# Patient Record
Sex: MALE | Race: OTHER
[De-identification: names, ages, dates, MRNs, and addresses within clinical notes are randomized per-mention and may not be internally consistent; named-entity substitution may affect disease eponyms.]

---

## 2021-07-27 ENCOUNTER — HOSPITAL ENCOUNTER (EMERGENCY)
Dept: HOSPITAL 7 - FB.ED | Age: 21
Discharge: HOME | End: 2021-07-27
Payer: SELF-PAY

## 2021-07-27 DIAGNOSIS — R79.89: ICD-10-CM

## 2021-07-27 DIAGNOSIS — R74.8: ICD-10-CM

## 2021-07-27 DIAGNOSIS — E86.0: Primary | ICD-10-CM

## 2021-07-27 DIAGNOSIS — Z72.0: ICD-10-CM

## 2021-07-27 PROCEDURE — 83880 ASSAY OF NATRIURETIC PEPTIDE: CPT

## 2021-07-27 PROCEDURE — 93005 ELECTROCARDIOGRAM TRACING: CPT

## 2021-07-27 PROCEDURE — 85025 COMPLETE CBC W/AUTO DIFF WBC: CPT

## 2021-07-27 PROCEDURE — 96375 TX/PRO/DX INJ NEW DRUG ADDON: CPT

## 2021-07-27 PROCEDURE — 83690 ASSAY OF LIPASE: CPT

## 2021-07-27 PROCEDURE — 84484 ASSAY OF TROPONIN QUANT: CPT

## 2021-07-27 PROCEDURE — 36415 COLL VENOUS BLD VENIPUNCTURE: CPT

## 2021-07-27 PROCEDURE — 96374 THER/PROPH/DIAG INJ IV PUSH: CPT

## 2021-07-27 PROCEDURE — 82550 ASSAY OF CK (CPK): CPT

## 2021-07-27 PROCEDURE — 81001 URINALYSIS AUTO W/SCOPE: CPT

## 2021-07-27 PROCEDURE — 80053 COMPREHEN METABOLIC PANEL: CPT

## 2021-07-27 PROCEDURE — 99284 EMERGENCY DEPT VISIT MOD MDM: CPT

## 2021-07-27 PROCEDURE — 86140 C-REACTIVE PROTEIN: CPT

## 2021-07-27 NOTE — EDM.PDOC
ED HPI GENERAL MEDICAL PROBLEM





- General


Chief Complaint: General


Stated Complaint: DEHYDRATED


Time Seen by Provider: 07/27/21 13:20


Source of Information: Reports: Patient


History Limitations: Reports: No Limitations





- History of Present Illness


INITIAL COMMENTS - FREE TEXT/NARRATIVE: 





c/o n/v





thinks he is dehydrated





hitchhiked from [place] to [place] 2w ago, been working construction





states to me for past 1.5w he has been lightheaded, trouble breathing, face 

tingling, feeling faint, dizzy





no HA/abd pain/CP, has had "funny feeling" at his L breast





no f/c/d





no have emesis today prior to arrival





slept well last night, went to work at 6:45a, did not feel well, kept working, 

comes to ED after V





ate chicken sandwich and toast today





drinking Gatorade x 2 (has one with him), pop, water, OJ





no COVID vax, had COVID Nov 2020





drank 2 light beers yesterday, none today


  ** Left Chest


Pain Score (Numeric/FACES): 3





- Related Data


                                    Allergies











Allergy/AdvReac Type Severity Reaction Status Date / Time


 


No Known Allergies Allergy   Verified 07/27/21 13:27











Home Meds: 


                                    Home Meds





NK [No Known Home Meds]  07/27/21 [History]











Past Medical History





- Past Health History


Medical/Surgical History: Denies Medical/Surgical History


Gastrointestinal History: Reports: Hepatitis


Other Gastrointestinal History: Hep B


Musculoskeletal History: Reports: Other (See Below)


Other Musculoskeletal History: Fracture both collarbones and left hip


Psychiatric History: Reports: Anxiety





- Infectious Disease History


Infectious Disease History: Reports: Hepatitis B





Social & Family History





- Family History


Family Medical History: No Pertinent Family History





- Tobacco Use


Tobacco Use Status *Q: Current Every Day Tobacco User


Years of Tobacco use: 6


Packs/Tins Daily: 1





- Caffeine Use


Caffeine Use: Reports: Energy Drinks





- Recreational Drug Use


Recreational Drug Use: No





ED ROS GENERAL





- Review of Systems


Review Of Systems: See Below


Constitutional: Reports: No Symptoms


HEENT: Reports: No Symptoms


Respiratory: Reports: No Symptoms.  Denies: Shortness of Breath, Cough


Cardiovascular: Reports: No Symptoms


Endocrine: Reports: No Symptoms


GI/Abdominal: Reports: Nausea, Vomiting.  Denies: Abdominal Pain, Diarrhea


: Reports: No Symptoms


Musculoskeletal: Reports: No Symptoms


Skin: Reports: No Symptoms


Neurological: Reports: No Symptoms


Psychiatric: Reports: No Symptoms


Hematologic/Lymphatic: Reports: No Symptoms


Immunologic: Reports: No Symptoms





ED EXAM, GENERAL





- Physical Exam


Exam: See Below


Exam Limited By: No Limitations


General Appearance: Alert, WD/WN, Other (alert, nontoxic, sit on edge of bed, 

holding emesis bag with 150 ml emesis)


Eye Exam: Bilateral Eye: EOMI, PERRL


Nose: Normal Inspection, Normal Mucosa, No Blood


Throat/Mouth: Normal Inspection, Normal Lips, Normal Teeth, Normal Gums, Normal 

Oropharynx, Normal Voice, No Airway Compromise


Head: Atraumatic, Normocephalic


Neck: Normal Inspection, Supple, Non-Tender, Full Range of Motion.  No: 

Lymphadenopathy (R), Lymphadenopathy (L)


Respiratory/Chest: No Respiratory Distress, Lungs Clear, Normal Breath Sounds, 

No Accessory Muscle Use, Chest Non-Tender


Cardiovascular: Regular Rate, Rhythm, No Edema, No Gallop, No Murmur


GI/Abdominal: Normal Bowel Sounds, Soft, Non-Tender, No Organomegaly, No 

Distention


Back Exam: Normal Inspection, Full Range of Motion.  No: CVA Tenderness (R), CVA

 Tenderness (L)


Extremities: Normal Inspection, Non-Tender, No Pedal Edema


Neurological: Alert, Oriented, CN II-XII Intact, Normal Cognition, No 

Motor/Sensory Deficits


Psychiatric: Normal Affect, Normal Mood


Skin Exam: Warm, Dry, Intact, Normal Color, No Rash


Lymphatic: No Adenopathy


  ** #1 Interpretation


EKG Date: 07/27/21


Time: 14:16


Rhythm: NSR


Rate (Beats/Min): 81


Axis: Normal


P-Wave: Present


QRS: Normal


ST-T: Normal


QT: Normal


Comparison: NA - No Prior EKG


EKG Interpretation Comments: 





wnl for age





Course





- Vital Signs


Last Recorded V/S: 


                                Last Vital Signs











Temp  37.3 C   07/27/21 13:11


 


Pulse  99   07/27/21 13:11


 


Resp  16   07/27/21 13:11


 


BP  142/88 H  07/27/21 13:11


 


Pulse Ox  96   07/27/21 13:11














- Orders/Labs/Meds


Orders: 


                               Active Orders 24 hr











 Category Date Time Status


 


 EKG Documentation Completion [RC] STAT Care  07/27/21 14:17 Active


 


 EKG 12 Lead [EK] Routine Ther  07/27/21 14:17 Ordered











Labs: 


                                Laboratory Tests











  07/27/21 07/27/21 07/27/21 Range/Units





  13:55 13:55 13:55 


 


WBC  5.2    (3.2-10.1)  x10-3/uL


 


RBC  5.15    (3.90-5.90)  x10(6)uL


 


Hgb  14.8    (12.9-17.7)  g/dL


 


Hct  44.0    (38.3-50.1)  %


 


MCV  85.4    (80.8-98.7)  fL


 


MCH  28.8    (27.0-33.3)  pg


 


MCHC  33.7    (28.7-35.3)  g/dL


 


RDW  15.2 H    (12.4-15.0)  %


 


Plt Count  285    (117-477)  x10(3)uL


 


MPV  7.6    (6.7-11.0)  fL


 


Neut % (Auto)  67.4    (40.3-71.8)  %


 


Lymph % (Auto)  21.2    (15.8-45.3)  %


 


Mono % (Auto)  9.8    (5.5-15.2)  %


 


Eos % (Auto)  1.3    (0.1-6.8)  %


 


Baso % (Auto)  0.3    (0.3-3.8)  %


 


Neut # (Auto)  3.5    (1.7-6.9)  x10-3/uL


 


Lymph # (Auto)  1.1    (0.5-4.5)  x10-3/uL


 


Mono # (Auto)  0.5    (0.0-1.2)  x10-3/uL


 


Eos # (Auto)  0.1    (0.0-0.6)  x10-3/uL


 


Baso # (Auto)  0.0    (0.0-0.3)  x10-3/uL


 


Sodium   145   (135-145)  mmol/L


 


Potassium   3.7   (3.5-5.3)  mmol/L


 


Chloride   104   (100-110)  mmol/L


 


Carbon Dioxide   28   (21-32)  mmol/L


 


BUN   19 H   (7-18)  mg/dL


 


Creatinine   1.0   (0.70-1.30)  mg/dL


 


Est Cr Clr Drug Dosing   119.64   mL/min


 


Estimated GFR (MDRD)   > 60   (>60)  


 


BUN/Creatinine Ratio   19.0   (9-20)  


 


Glucose   87   ()  mg/dL


 


Calcium   9.0   (8.6-10.2)  mg/dL


 


Total Bilirubin   0.7   (0.1-1.3)  mg/dL


 


AST   18   (5-25)  IU/L


 


ALT   26   (12-36)  U/L


 


Alkaline Phosphatase   96   ()  IU/L


 


Creatine Kinase   332 H*   ()  IU/L


 


Troponin I    < 4.0 L  (4.0-60.3)  pg/mL


 


C-Reactive Protein    < 0.2 L  (0.5-0.9)  mg/dL


 


NT-Pro-B Natriuret Pep     (<=125)  pg/mL


 


Total Protein   7.8   (6.0-8.0)  g/dL


 


Albumin   4.3   (3.5-5.2)  g/dL


 


Globulin   3.5   g/dL


 


Albumin/Globulin Ratio   1.2   


 


Lipase    72 L  ()  U/L


 


Urine Color     (YELLOW)  


 


Urine Appearance     (CLEAR)  


 


Urine pH     (5.0-6.5)  


 


Ur Specific Gravity     (1.010-1.025)  


 


Urine Protein     (NEGATIVE)  mg/dL


 


Urine Glucose (UA)     (NORMAL)  mg/dL


 


Urine Ketones     (NEGATIVE)  mg/dL


 


Urine Occult Blood     (NEGATIVE)  


 


Urine Nitrite     (NEGATIVE)  


 


Urine Bilirubin     (NEGATIVE)  


 


Urine Urobilinogen     (NEGATIVE)  mg/dL


 


Ur Leukocyte Esterase     (NEGATIVE)  


 


Urine RBC     (0-5)  


 


Urine WBC     (0-5)  


 


Ur Squamous Epith Cells     (NS,R,O)  


 


Urine Bacteria     (NS)  














  07/27/21 07/27/21 07/27/21 Range/Units





  14:55 16:00 16:00 


 


WBC     (3.2-10.1)  x10-3/uL


 


RBC     (3.90-5.90)  x10(6)uL


 


Hgb     (12.9-17.7)  g/dL


 


Hct     (38.3-50.1)  %


 


MCV     (80.8-98.7)  fL


 


MCH     (27.0-33.3)  pg


 


MCHC     (28.7-35.3)  g/dL


 


RDW     (12.4-15.0)  %


 


Plt Count     (117-477)  x10(3)uL


 


MPV     (6.7-11.0)  fL


 


Neut % (Auto)     (40.3-71.8)  %


 


Lymph % (Auto)     (15.8-45.3)  %


 


Mono % (Auto)     (5.5-15.2)  %


 


Eos % (Auto)     (0.1-6.8)  %


 


Baso % (Auto)     (0.3-3.8)  %


 


Neut # (Auto)     (1.7-6.9)  x10-3/uL


 


Lymph # (Auto)     (0.5-4.5)  x10-3/uL


 


Mono # (Auto)     (0.0-1.2)  x10-3/uL


 


Eos # (Auto)     (0.0-0.6)  x10-3/uL


 


Baso # (Auto)     (0.0-0.3)  x10-3/uL


 


Sodium     (135-145)  mmol/L


 


Potassium     (3.5-5.3)  mmol/L


 


Chloride     (100-110)  mmol/L


 


Carbon Dioxide     (21-32)  mmol/L


 


BUN     (7-18)  mg/dL


 


Creatinine     (0.70-1.30)  mg/dL


 


Est Cr Clr Drug Dosing     mL/min


 


Estimated GFR (MDRD)     (>60)  


 


BUN/Creatinine Ratio     (9-20)  


 


Glucose     ()  mg/dL


 


Calcium     (8.6-10.2)  mg/dL


 


Total Bilirubin     (0.1-1.3)  mg/dL


 


AST     (5-25)  IU/L


 


ALT     (12-36)  U/L


 


Alkaline Phosphatase     ()  IU/L


 


Creatine Kinase     ()  IU/L


 


Troponin I   < 4.0 L   (4.0-60.3)  pg/mL


 


C-Reactive Protein     (0.5-0.9)  mg/dL


 


NT-Pro-B Natriuret Pep    9  (<=125)  pg/mL


 


Total Protein     (6.0-8.0)  g/dL


 


Albumin     (3.5-5.2)  g/dL


 


Globulin     g/dL


 


Albumin/Globulin Ratio     


 


Lipase     ()  U/L


 


Urine Color  Yellow    (YELLOW)  


 


Urine Appearance  Clear    (CLEAR)  


 


Urine pH  6.0    (5.0-6.5)  


 


Ur Specific Gravity  1.005 L    (1.010-1.025)  


 


Urine Protein  Negative    (NEGATIVE)  mg/dL


 


Urine Glucose (UA)  Normal    (NORMAL)  mg/dL


 


Urine Ketones  Negative    (NEGATIVE)  mg/dL


 


Urine Occult Blood  Negative    (NEGATIVE)  


 


Urine Nitrite  Negative    (NEGATIVE)  


 


Urine Bilirubin  Negative    (NEGATIVE)  


 


Urine Urobilinogen  Normal    (NEGATIVE)  mg/dL


 


Ur Leukocyte Esterase  Negative    (NEGATIVE)  


 


Urine RBC  Not seen    (0-5)  


 


Urine WBC  0-5    (0-5)  


 


Ur Squamous Epith Cells  Few H    (NS,R,O)  


 


Urine Bacteria  Rare H    (NS)  











Meds: 


Medications














Discontinued Medications














Generic Name Dose Route Start Last Admin





  Trade Name Freq  PRN Reason Stop Dose Admin


 


Sodium Chloride  1,000 mls @ 999 mls/hr  07/27/21 13:39  07/27/21 13:43





  Normal Saline  IV  07/27/21 14:39  999 mls/hr





  .BOLUS ONE   Administration


 


Sodium Chloride  1,000 mls @ 999 mls/hr  07/27/21 13:40  07/27/21 14:48





  Normal Saline  IV  07/27/21 14:40  999 mls/hr





  .BOLUS ONE   Administration


 


Sodium Chloride  1,000 mls @ 999 mls/hr  07/27/21 14:44  07/27/21 15:53





  Normal Saline  IV  07/27/21 15:44  999 mls/hr





  .BOLUS ONE   Administration


 


Ketorolac Tromethamine  30 mg  07/27/21 13:39  07/27/21 13:43





  Ketorolac 30 Mg/Ml Sdv  IVPUSH  07/27/21 13:40  30 mg





  ONETIME ONE   Administration


 


Ondansetron HCl  4 mg  07/27/21 13:39  07/27/21 13:43





  Ondansetron 4 Mg/2 Ml Sdv  IVPUSH  07/27/21 13:40  4 mg





  ONETIME ONE   Administration














- Re-Assessments/Exams


Free Text/Narrative Re-Assessment/Exam: 





07/27/21 17:07


pt with inc'd BUN and CK c/w mild to mod dehydration





u/a after 1 liter NS was unremarkable





given 3 liters of NS total





pt reports h/o anxiety, does appear mildly anxious here





stated he did not feel better after IVFs which is inconsistent with physiologic 

parameters and seems c/w anxiety





he had vague, nonspecific complaints about his heart with palpitations at times 

going back to when he had COVID in November, however trop x 2 (2h apart) and BNP

 were neg without evidence of CMP or CVD, EKG was WNL for age, f/u with a local 

PCP was recommended as pt stated he was going to live and work in the area 

indefinitely





states he is living with his boss, that there are just the 2 of them in the 

household





Departure





- Departure


Time of Disposition: 17:05


Disposition: Home, Self-Care 01


Condition: Good


Clinical Impression: 


 Dehydration, Elevated BUN, Elevated creatine kinase








- Discharge Information


*PRESCRIPTION DRUG MONITORING PROGRAM REVIEWED*: Not Applicable


*COPY OF PRESCRIPTION DRUG MONITORING REPORT IN PATIENT RAMA: Not Applicable


Instructions:  Rehydration, Adult


Referrals: 


PCP,None [Primary Care Provider] - 


Forms:  ED Department Discharge


Additional Instructions: 


Maintain fluids, at least 4 liters daily without caffeine when working in hot 

weather.





Rest this evening.





May return to work tomorrow.





See a primary care physician in the next week for follow up and further 

recommendations.





Sepsis Event Note (ED)





- Evaluation


Sepsis Screening Result: No Definite Risk





- Focused Exam


Vital Signs: 


                                   Vital Signs











  Temp Pulse Resp BP Pulse Ox


 


 07/27/21 13:11  37.3 C  99  16  142/88 H  96














- My Orders


Last 24 Hours: 


My Active Orders





07/27/21 14:17


EKG Documentation Completion [RC] STAT 


EKG 12 Lead [EK] Routine 














- Assessment/Plan


Last 24 Hours: 


My Active Orders





07/27/21 14:17


EKG Documentation Completion [RC] STAT 


EKG 12 Lead [EK] Routine

## 2021-08-02 ENCOUNTER — HOSPITAL ENCOUNTER (EMERGENCY)
Dept: HOSPITAL 7 - FB.ED | Age: 21
Discharge: HOME | End: 2021-08-02
Payer: SELF-PAY

## 2021-08-02 DIAGNOSIS — Z72.0: ICD-10-CM

## 2021-08-02 DIAGNOSIS — Z79.899: ICD-10-CM

## 2021-08-02 DIAGNOSIS — F41.9: Primary | ICD-10-CM

## 2021-08-02 PROCEDURE — 96372 THER/PROPH/DIAG INJ SC/IM: CPT

## 2021-08-02 PROCEDURE — 99283 EMERGENCY DEPT VISIT LOW MDM: CPT

## 2021-08-02 NOTE — EDM.PDOC
ED HPI GENERAL MEDICAL PROBLEM





- General


Chief Complaint: General


Stated Complaint: DIZZY/SOB


Time Seen by Provider: 08/02/21 12:10


Source of Information: Reports: Patient


History Limitations: Reports: No Limitations





- History of Present Illness


INITIAL COMMENTS - FREE TEXT/NARRATIVE: 





Patient presented to the  ED because of dyspnea for 1 month. He was diagnosed 

with Anxiety 4 months ago while he was in [place] and is supposed to be taking 

Prozac 20 mg daily but has not filled his prescription. 





- Related Data


                                    Allergies











Allergy/AdvReac Type Severity Reaction Status Date / Time


 


No Known Allergies Allergy   Verified 07/27/21 13:27











Home Meds: 


                                    Home Meds





FLUoxetine HCl [Prozac] 20 mg PO DAILY #30 capsule 08/02/21 [Rx]











Past Medical History





- Past Health History


Medical/Surgical History: Denies Medical/Surgical History


Gastrointestinal History: Reports: Hepatitis


Other Gastrointestinal History: Hep B


Musculoskeletal History: Reports: Other (See Below)


Other Musculoskeletal History: Fracture both collarbones and left hip


Psychiatric History: Reports: Anxiety





- Infectious Disease History


Infectious Disease History: Reports: Hepatitis B





Social & Family History





- Family History


Family Medical History: No Pertinent Family History





- Tobacco Use


Tobacco Use Status *Q: Current Every Day Tobacco User


Years of Tobacco use: 5


Packs/Tins Daily: 0.5





- Caffeine Use


Caffeine Use: Reports: Soda





- Recreational Drug Use


Recreational Drug Use: No





ED ROS GENERAL





- Review of Systems


Review Of Systems: See Below


Constitutional: Reports: No Symptoms


HEENT: Reports: No Symptoms


Respiratory: Reports: Shortness of Breath


Cardiovascular: Reports: No Symptoms


Endocrine: Reports: No Symptoms


GI/Abdominal: Reports: No Symptoms


: Reports: No Symptoms


Musculoskeletal: Reports: No Symptoms


Skin: Reports: No Symptoms


Neurological: Reports: No Symptoms


Psychiatric: Reports: Anxiety


Hematologic/Lymphatic: Reports: No Symptoms





ED EXAM, GENERAL





- Physical Exam


Exam: See Below


Exam Limited By: No Limitations


General Appearance: Alert, No Apparent Distress


Eye Exam: Bilateral Eye: PERRL


Ears: Normal External Exam, Normal Canal


Nose: Normal Inspection, Normal Mucosa, No Blood


Throat/Mouth: Normal Inspection, Normal Lips, Normal Teeth


Head: Atraumatic, Normocephalic


Neck: Normal Inspection, Supple, Non-Tender, Full Range of Motion


Respiratory/Chest: No Respiratory Distress, Lungs Clear, Normal Breath Sounds


Cardiovascular: Normal Peripheral Pulses, Regular Rate, Rhythm, No Edema, No 

Gallop, No JVD, No Murmur, No Rub


GI/Abdominal: Normal Bowel Sounds, Soft, Non-Tender, No Organomegaly, No 

Distention, No Abnormal Bruit, No Mass


Back Exam: Normal Inspection, Full Range of Motion


Extremities: Normal Inspection, Normal Range of Motion, Non-Tender


  ** #1 Interpretation


EKG Date: 08/02/21


Time: 12:25


Rhythm: NSR


Rate (Beats/Min): 67


Axis: Normal


P-Wave: Present


QRS: Normal


ST-T: Normal


QT: Normal


Comparison: NA - No Prior EKG


EKG Interpretation Comments: 





NSR





Course





- Vital Signs


Text/Narrative:: 





Ativan 1 mg IM x1


Last Recorded V/S: 


                                Last Vital Signs











Temp  36.4 C   08/02/21 12:05


 


Pulse  75   08/02/21 12:05


 


Resp  20   08/02/21 12:05


 


BP  128/83   08/02/21 12:05


 


Pulse Ox  96   08/02/21 12:05














- Orders/Labs/Meds


Meds: 


Medications














Discontinued Medications














Generic Name Dose Route Start Last Admin





  Trade Name Baldomero  PRN Reason Stop Dose Admin


 


Lorazepam  1 mg  08/02/21 12:55  08/02/21 13:09





  Lorazepam 2 Mg/Ml Sdv  IM  08/02/21 12:56  1 mg





  NOW STA   Administration














Departure





- Departure


Time of Disposition: 13:10


Disposition: Home, Self-Care 01


Condition: Good


Clinical Impression: 


 Anxiety








- Discharge Information


Prescriptions: 


FLUoxetine HCl [Prozac] 20 mg PO DAILY #30 capsule


Instructions:  Generalized Anxiety Disorder, Adult, Managing Anxiety, Adult


Referrals: 


PCP,None [Primary Care Provider] - 


Forms:  ED Department Discharge


Additional Instructions: 


Please read discharge instructions on anxiety disorder


Take prozac 20 mg daily


Follow up with your doctor  after 2 weeks to see how you are doing with the the 

prozac


Call either [facility] or [facility] in [place] to schedule an appointment





Sepsis Event Note (ED)





- Evaluation


Sepsis Screening Result: No Definite Risk





- Focused Exam


Vital Signs: 


                                   Vital Signs











  Temp Pulse Resp BP Pulse Ox


 


 08/02/21 12:05  36.4 C  75  20  128/83  96

## 2021-08-11 ENCOUNTER — HOSPITAL ENCOUNTER (EMERGENCY)
Dept: HOSPITAL 7 - FB.ED | Age: 21
Discharge: HOME | End: 2021-08-11
Payer: SELF-PAY

## 2021-08-11 DIAGNOSIS — E86.0: Primary | ICD-10-CM

## 2021-08-11 DIAGNOSIS — R11.2: ICD-10-CM

## 2021-08-11 DIAGNOSIS — Z79.899: ICD-10-CM

## 2021-08-11 PROCEDURE — 80307 DRUG TEST PRSMV CHEM ANLYZR: CPT

## 2021-08-11 PROCEDURE — 85025 COMPLETE CBC W/AUTO DIFF WBC: CPT

## 2021-08-11 PROCEDURE — 84484 ASSAY OF TROPONIN QUANT: CPT

## 2021-08-11 PROCEDURE — 99285 EMERGENCY DEPT VISIT HI MDM: CPT

## 2021-08-11 PROCEDURE — 86140 C-REACTIVE PROTEIN: CPT

## 2021-08-11 PROCEDURE — C9113 INJ PANTOPRAZOLE SODIUM, VIA: HCPCS

## 2021-08-11 PROCEDURE — 36415 COLL VENOUS BLD VENIPUNCTURE: CPT

## 2021-08-11 PROCEDURE — 96375 TX/PRO/DX INJ NEW DRUG ADDON: CPT

## 2021-08-11 PROCEDURE — 93005 ELECTROCARDIOGRAM TRACING: CPT

## 2021-08-11 PROCEDURE — 81001 URINALYSIS AUTO W/SCOPE: CPT

## 2021-08-11 PROCEDURE — 83690 ASSAY OF LIPASE: CPT

## 2021-08-11 PROCEDURE — 80305 DRUG TEST PRSMV DIR OPT OBS: CPT

## 2021-08-11 PROCEDURE — 96376 TX/PRO/DX INJ SAME DRUG ADON: CPT

## 2021-08-11 PROCEDURE — 96374 THER/PROPH/DIAG INJ IV PUSH: CPT

## 2021-08-11 PROCEDURE — 80053 COMPREHEN METABOLIC PANEL: CPT

## 2021-08-11 PROCEDURE — 82550 ASSAY OF CK (CPK): CPT

## 2021-08-11 NOTE — EDM.PDOC
ED HPI GENERAL MEDICAL PROBLEM





- General


Stated Complaint: THROWING UP BLOOD/DIZZY


Time Seen by Provider: 08/11/21 11:45


Source of Information: Reports: Patient, Other (friend)


History Limitations: Reports: No Limitations


  ** Chest


Pain Score (Numeric/FACES): 7





- Related Data


                                    Allergies











Allergy/AdvReac Type Severity Reaction Status Date / Time


 


No Known Allergies Allergy   Verified 07/27/21 13:27











Home Meds: 


                                    Home Meds





Omeprazole 20 mg PO DAILY #14 capsule. 08/11/21 [Rx]


Ondansetron [Ondansetron ODT] 4 mg PO Q6H PRN #8 tab.sultanadis 08/11/21 [Rx]











Past Medical History





- Past Health History


Medical/Surgical History: Denies Medical/Surgical History


Gastrointestinal History: Reports: Hepatitis


Other Gastrointestinal History: Hep B


Musculoskeletal History: Reports: Other (See Below)


Other Musculoskeletal History: Fracture both collarbones and left hip


Psychiatric History: Reports: Anxiety





- Infectious Disease History


Infectious Disease History: Reports: Hepatitis B





Social & Family History





- Family History


Family Medical History: No Pertinent Family History





- Caffeine Use


Caffeine Use: Reports: Soda





ED ROS GENERAL





- Review of Systems


Review Of Systems: See Below


Constitutional: Reports: No Symptoms


HEENT: Reports: No Symptoms


Respiratory: Reports: No Symptoms


Cardiovascular: Reports: No Symptoms


Endocrine: Reports: No Symptoms


GI/Abdominal: Reports: Abdominal Pain


: Reports: No Symptoms


Musculoskeletal: Reports: No Symptoms


Skin: Reports: No Symptoms


Neurological: Reports: No Symptoms


Psychiatric: Reports: No Symptoms


Hematologic/Lymphatic: Reports: No Symptoms


Immunologic: Reports: No Symptoms





ED EXAM, GI/ABD





- Physical Exam


Exam: See Below


Exam Limited By: No Limitations


General Appearance: Alert, WD/WN, Mild Distress


Ears: Hearing Grossly Normal


Nose: Normal Inspection


Throat/Mouth: Normal Inspection, Normal Lips, Normal Voice, No Airway Compromise


Head: Atraumatic, Normocephalic


Neck: Normal Inspection, Supple, Non-Tender, Full Range of Motion.  No: 

Lymphadenopathy (R), Lymphadenopathy (L)


Respiratory/Chest: No Respiratory Distress, Lungs Clear, Normal Breath Sounds, 

No Accessory Muscle Use, Chest Non-Tender


Cardiovascular: Regular Rate, Rhythm, No Edema, No Murmur


GI/Abdominal Exam: Normal Bowel Sounds, Other (1+ tender across entire upper abd

that improved with fluids and meds)


Back Exam: Normal Inspection, Full Range of Motion


Extremities: Normal Inspection, Non-Tender, No Pedal Edema


Neurological: Alert, Oriented, CN II-XII Intact, Normal Cognition, No 

Motor/Sensory Deficits


Skin Exam: Warm, Dry, Intact, Normal Color, No Rash


Lymphatic: No Adenopathy


  ** #1 Interpretation


EKG Date: 08/11/21


Time: 00:23


Rhythm: NSR


Rate (Beats/Min): 87


Axis: Normal


P-Wave: Present


ST-T: Normal


Comparison: No Change


EKG Interpretation Comments: 





NSC c/w 6-13-19, mild inc'd QT from 440 to 491 now, inc'd  to 225, LAFB, 

baseline artifact, no acute/ischemic/ST changes





Course





- Vital Signs


Last Recorded V/S: 


                                Last Vital Signs











Temp  36.8 C   08/11/21 11:18


 


Pulse  117 H  08/11/21 11:18


 


Resp  21 H  08/11/21 11:18


 


BP  141/92 H  08/11/21 11:18


 


Pulse Ox  98   08/11/21 11:18














- Orders/Labs/Meds


Orders: 


                               Active Orders 24 hr











 Category Date Time Status


 


 EKG Documentation Completion [RC] ASDIRECTED Care  08/11/21 16:10 Ordered


 


 EKG 12 Lead [EK] Routine Ther  08/11/21 16:10 Ordered











Labs: 


                                Laboratory Tests











  08/11/21 08/11/21 08/11/21 Range/Units





  11:55 11:55 11:55 


 


WBC  10.2 H    (3.2-10.1)  x10-3/uL


 


RBC  5.73    (3.90-5.90)  x10(6)uL


 


Hgb  16.1    (12.9-17.7)  g/dL


 


Hct  48.5    (38.3-50.1)  %


 


MCV  84.7    (80.8-98.7)  fL


 


MCH  28.1    (27.0-33.3)  pg


 


MCHC  33.2    (28.7-35.3)  g/dL


 


RDW  14.9    (12.4-15.0)  %


 


Plt Count  321    (117-477)  x10(3)uL


 


MPV  7.5    (6.7-11.0)  fL


 


Neut % (Auto)  82.4 H    (40.3-71.8)  %


 


Lymph % (Auto)  10.5 L    (15.8-45.3)  %


 


Mono % (Auto)  6.8    (5.5-15.2)  %


 


Eos % (Auto)  0.1    (0.1-6.8)  %


 


Baso % (Auto)  0.2 L    (0.3-3.8)  %


 


Neut # (Auto)  8.4 H    (1.7-6.9)  x10-3/uL


 


Lymph # (Auto)  1.1    (0.5-4.5)  x10-3/uL


 


Mono # (Auto)  0.7    (0.0-1.2)  x10-3/uL


 


Eos # (Auto)  0.0    (0.0-0.6)  x10-3/uL


 


Baso # (Auto)  0.0    (0.0-0.3)  x10-3/uL


 


Sodium   143   (135-145)  mmol/L


 


Potassium   4.2   (3.5-5.3)  mmol/L


 


Chloride   101   (100-110)  mmol/L


 


Carbon Dioxide   25   (21-32)  mmol/L


 


BUN   12   (7-18)  mg/dL


 


Creatinine   0.9   (0.70-1.30)  mg/dL


 


Est Cr Clr Drug Dosing   141.31   mL/min


 


Estimated GFR (MDRD)   > 60   (>60)  


 


BUN/Creatinine Ratio   13.3   (9-20)  


 


Glucose   102   ()  mg/dL


 


Calcium   9.5   (8.6-10.2)  mg/dL


 


Total Bilirubin   0.7   (0.1-1.3)  mg/dL


 


AST   16  D   (5-25)  IU/L


 


ALT   26   (12-36)  U/L


 


Alkaline Phosphatase   87   ()  IU/L


 


Creatine Kinase   355 H*   ()  IU/L


 


Troponin I    < 4.0 L  (4.0-60.3)  pg/mL


 


C-Reactive Protein    < 0.2 L  (0.5-0.9)  mg/dL


 


Total Protein   8.6 H   (6.0-8.0)  g/dL


 


Albumin   4.8   (3.5-5.2)  g/dL


 


Globulin   3.8   g/dL


 


Albumin/Globulin Ratio   1.3   


 


Lipase    69 L  ()  U/L


 


Urine Color     (YELLOW)  


 


Urine Appearance     (CLEAR)  


 


Urine pH     (5.0-6.5)  


 


Ur Specific Gravity     (1.010-1.025)  


 


Urine Protein     (NEGATIVE)  mg/dL


 


Urine Glucose (UA)     (NORMAL)  mg/dL


 


Urine Ketones     (NEGATIVE)  mg/dL


 


Urine Occult Blood     (NEGATIVE)  


 


Urine Nitrite     (NEGATIVE)  


 


Urine Bilirubin     (NEGATIVE)  


 


Urine Urobilinogen     (NEGATIVE)  mg/dL


 


Ur Leukocyte Esterase     (NEGATIVE)  


 


Urine RBC     (0-5)  


 


Urine WBC     (0-5)  


 


Ur Squamous Epith Cells     (NS,R,O)  


 


Urine Bacteria     (NS)  


 


Urine Opiates Screen     (NEGATIVE)  


 


Ur Oxycodone Screen     (NEGATIVE)  


 


Ur Propoxyphene Screen     (NEGATIVE)  


 


Ur Barbituates Screen     (NEGATIVE)  


 


Ur Tricyclics Screen     (NEGATIVE)  


 


Ur Phencyclidine Scrn     (NEGATIVE)  


 


Ur Amphetamine Screen     (NEGATIVE)  


 


Urine MDMA Screen     (NEGATIVE)  


 


U Benzodiazepines Scrn     (NEGATIVE)  


 


U Cocaine Metab Screen     (NEGATIVE)  


 


U Marijuana (THC) Screen     (NEGATIVE)  


 


Ethyl Alcohol     (<0.03)  %














  08/11/21 08/11/21 08/11/21 Range/Units





  11:55 17:00 17:01 


 


WBC     (3.2-10.1)  x10-3/uL


 


RBC     (3.90-5.90)  x10(6)uL


 


Hgb     (12.9-17.7)  g/dL


 


Hct     (38.3-50.1)  %


 


MCV     (80.8-98.7)  fL


 


MCH     (27.0-33.3)  pg


 


MCHC     (28.7-35.3)  g/dL


 


RDW     (12.4-15.0)  %


 


Plt Count     (117-477)  x10(3)uL


 


MPV     (6.7-11.0)  fL


 


Neut % (Auto)     (40.3-71.8)  %


 


Lymph % (Auto)     (15.8-45.3)  %


 


Mono % (Auto)     (5.5-15.2)  %


 


Eos % (Auto)     (0.1-6.8)  %


 


Baso % (Auto)     (0.3-3.8)  %


 


Neut # (Auto)     (1.7-6.9)  x10-3/uL


 


Lymph # (Auto)     (0.5-4.5)  x10-3/uL


 


Mono # (Auto)     (0.0-1.2)  x10-3/uL


 


Eos # (Auto)     (0.0-0.6)  x10-3/uL


 


Baso # (Auto)     (0.0-0.3)  x10-3/uL


 


Sodium     (135-145)  mmol/L


 


Potassium     (3.5-5.3)  mmol/L


 


Chloride     (100-110)  mmol/L


 


Carbon Dioxide     (21-32)  mmol/L


 


BUN     (7-18)  mg/dL


 


Creatinine     (0.70-1.30)  mg/dL


 


Est Cr Clr Drug Dosing     mL/min


 


Estimated GFR (MDRD)     (>60)  


 


BUN/Creatinine Ratio     (9-20)  


 


Glucose     ()  mg/dL


 


Calcium     (8.6-10.2)  mg/dL


 


Total Bilirubin     (0.1-1.3)  mg/dL


 


AST     (5-25)  IU/L


 


ALT     (12-36)  U/L


 


Alkaline Phosphatase     ()  IU/L


 


Creatine Kinase     ()  IU/L


 


Troponin I     (4.0-60.3)  pg/mL


 


C-Reactive Protein     (0.5-0.9)  mg/dL


 


Total Protein     (6.0-8.0)  g/dL


 


Albumin     (3.5-5.2)  g/dL


 


Globulin     g/dL


 


Albumin/Globulin Ratio     


 


Lipase     ()  U/L


 


Urine Color    Yellow  (YELLOW)  


 


Urine Appearance    Clear  (CLEAR)  


 


Urine pH    6.5  (5.0-6.5)  


 


Ur Specific Gravity    1.020  (1.010-1.025)  


 


Urine Protein    Negative  (NEGATIVE)  mg/dL


 


Urine Glucose (UA)    Normal  (NORMAL)  mg/dL


 


Urine Ketones    Negative  (NEGATIVE)  mg/dL


 


Urine Occult Blood    Negative  (NEGATIVE)  


 


Urine Nitrite    Negative  (NEGATIVE)  


 


Urine Bilirubin    Negative  (NEGATIVE)  


 


Urine Urobilinogen    Normal  (NEGATIVE)  mg/dL


 


Ur Leukocyte Esterase    Negative  (NEGATIVE)  


 


Urine RBC    0-5  (0-5)  


 


Urine WBC    0-5  (0-5)  


 


Ur Squamous Epith Cells    Rare  (NS,R,O)  


 


Urine Bacteria    Occasional H  (NS)  


 


Urine Opiates Screen   Negative   (NEGATIVE)  


 


Ur Oxycodone Screen   Negative   (NEGATIVE)  


 


Ur Propoxyphene Screen   Negative   (NEGATIVE)  


 


Ur Barbituates Screen   Negative   (NEGATIVE)  


 


Ur Tricyclics Screen   Negative   (NEGATIVE)  


 


Ur Phencyclidine Scrn   Negative   (NEGATIVE)  


 


Ur Amphetamine Screen   Positive H   (NEGATIVE)  


 


Urine MDMA Screen   Negative   (NEGATIVE)  


 


U Benzodiazepines Scrn   Negative   (NEGATIVE)  


 


U Cocaine Metab Screen   Negative   (NEGATIVE)  


 


U Marijuana (THC) Screen   Negative   (NEGATIVE)  


 


Ethyl Alcohol  0.06 H    (<0.03)  %











Meds: 


Medications














Discontinued Medications














Generic Name Dose Route Start Last Admin





  Trade Name Baldomero  PRN Reason Stop Dose Admin


 


Acetaminophen  1,000 mg  08/11/21 15:55  08/11/21 16:08





  Acetaminophen 500 Mg Tab  PO  08/11/21 15:56  1,000 mg





  ONETIME ONE   Administration


 


Al Hydroxide/Mg Hydroxide 30  0 ml  08/11/21 13:13  08/11/21 13:16





  ml/ Lidocaine HCl 15 ml  PO  08/11/21 13:14  45 ml





  ONETIME ONE   Administration


 


Diphenhydramine HCl  25 mg  08/11/21 11:45  08/11/21 11:50





  Diphenhydramine 50 Mg/Ml Sdv  IVPUSH  08/11/21 11:46  25 mg





  ONETIME ONE   Administration


 


Diphenhydramine HCl  25 mg  08/11/21 15:55  08/11/21 16:08





  Diphenhydramine 50 Mg/Ml Sdv  IVPUSH  08/11/21 15:56  25 mg





  ONETIME ONE   Administration


 


Sodium Chloride  1,000 mls @ 999 mls/hr  08/11/21 11:45  08/11/21 11:49





  Normal Saline  IV  08/11/21 12:45  999 mls/hr





  .BOLUS ONE   Administration


 


Sodium Chloride  1,000 mls @ 999 mls/hr  08/11/21 13:11  08/11/21 13:13





  Normal Saline  IV  08/11/21 14:11  999 mls/hr





  .BOLUS ONE   Administration


 


Sodium Chloride  1,000 mls @ 999 mls/hr  08/11/21 14:32  08/11/21 14:39





  Normal Saline  IV  08/11/21 15:32  999 mls/hr





  .BOLUS ONE   Administration


 


Sodium Chloride  1,000 mls @ 999 mls/hr  08/11/21 15:55  08/11/21 16:00





  Normal Saline  IV  08/11/21 16:55  999 mls/hr





  .BOLUS ONE   Administration


 


Ketorolac Tromethamine  30 mg  08/11/21 11:45  08/11/21 11:50





  Ketorolac 30 Mg/Ml Sdv  IVPUSH  08/11/21 11:46  30 mg





  ONETIME ONE   Administration


 


Metoclopramide HCl  10 mg  08/11/21 13:12  08/11/21 13:16





  Metoclopramide 10 Mg/2 Ml Sdv  IVPUSH  08/11/21 13:13  10 mg





  ONETIME ONE   Administration


 


Ondansetron HCl  4 mg  08/11/21 11:45  08/11/21 11:49





  Ondansetron 4 Mg/2 Ml Sdv  IVPUSH  08/11/21 11:46  4 mg





  ONETIME ONE   Administration


 


Ondansetron HCl  4 mg  08/11/21 15:55  08/11/21 16:08





  Ondansetron 4 Mg/2 Ml Sdv  IVPUSH  08/11/21 15:56  4 mg





  ONETIME ONE   Administration


 


Pantoprazole Sodium  40 mg  08/11/21 15:55  08/11/21 16:09





  Pantoprazole 40 Mg Vial  IVPUSH  08/11/21 15:56  40 mg





  ONETIME ONE   Administration














- Re-Assessments/Exams


Free Text/Narrative Re-Assessment/Exam: 





08/11/21 17:45


4 liters NS required before pt produced a urine, u/a with no ketones and SG 

1.020





pt aware that his ethanol level was at legal limit (80) at 11a and 300 at 

midnight





pt also with meth in his urine





[place] when his friend Prasad

 from [place] drove to [facility] and picked him up





Prasad said that pt would be staying with him and his wife for a few days





[place]





pt aware that he needs to address alcohol, meth and lifestyle issues





08/11/21 17:54


mild inc'd CK c/w meth use and dehydration





Departure





- Departure


Time of Disposition: 17:34


Disposition: Home, Self-Care 01


Condition: Good


Clinical Impression: 


 Dehydration, Nausea and vomiting








- Discharge Information


*PRESCRIPTION DRUG MONITORING PROGRAM REVIEWED*: Not Applicable


*COPY OF PRESCRIPTION DRUG MONITORING REPORT IN PATIENT RAMA: Not Applicable


Prescriptions: 


Omeprazole 20 mg PO DAILY #14 capsule.


Ondansetron [Ondansetron ODT] 4 mg PO Q6H PRN #8 tab.rapdis


 PRN Reason: Nausea


Instructions:  Nausea and Vomiting, Adult, Rehydration, Adult


Referrals: 


PCP,None [Primary Care Provider] - 


Forms:  ED Return to Work/School Form


Additional Instructions: 


Avoid alcohol and street drugs.





Maintain fluids, at least 2 liters daily without caffeine or alcohol when you 

are indoors, at least 4 liters daily when you are working outside in hot 

weather.





Get adequate rest.





Eat 3 meals a day. Do not eat skip meals.





To decrease acid production, take omeprazole 20 mg 1 tab daily for 14 days.





To coat and protect the lower esophagus and stomach, take liquid antacid (such 

as Maalox or Mylanta) 30 ml 4 times a day.





For nausea, take ondansetron ODT 4 mg 1 tab under the tongue every 6 hours as 

needed.





See your primary care physician/provider (PCP) in 5 days for further 

recommendations.





See an alcohol counselor in the next 1-2 weeks, which you will find helpful as 

well.





Sepsis Event Note (ED)





- Focused Exam


Vital Signs: 


                                   Vital Signs











  Temp Pulse Resp BP Pulse Ox


 


 08/11/21 11:18  36.8 C  117 H  21 H  141/92 H  98














- My Orders


Last 24 Hours: 


My Active Orders





08/11/21 16:10


EKG Documentation Completion [RC] ASDIRECTED 


EKG 12 Lead [EK] Routine 














- Assessment/Plan


Last 24 Hours: 


My Active Orders





08/11/21 16:10


EKG Documentation Completion [RC] ASDIRECTED 


EKG 12 Lead [EK] Routine